# Patient Record
Sex: MALE | Race: WHITE
[De-identification: names, ages, dates, MRNs, and addresses within clinical notes are randomized per-mention and may not be internally consistent; named-entity substitution may affect disease eponyms.]

---

## 2021-11-08 ENCOUNTER — HOSPITAL ENCOUNTER (OUTPATIENT)
Age: 71
End: 2021-11-08
Payer: COMMERCIAL

## 2021-11-08 DIAGNOSIS — M25.512: ICD-10-CM

## 2021-11-08 DIAGNOSIS — M25.511: Primary | ICD-10-CM

## 2021-11-08 PROCEDURE — 73030 X-RAY EXAM OF SHOULDER: CPT

## 2021-11-09 ENCOUNTER — HOSPITAL ENCOUNTER (OUTPATIENT)
Age: 71
End: 2021-11-09
Payer: COMMERCIAL

## 2021-11-09 DIAGNOSIS — R03.0: Primary | ICD-10-CM

## 2021-11-09 DIAGNOSIS — R74.8: ICD-10-CM

## 2021-11-09 LAB
ADD MANUAL DIFF / SLIDE REVIEW: NO
ALBUMIN SERPL-MCNC: 4.2 G/DL (ref 3.5–5)
ALBUMIN/GLOB SERPL: 1.6 {RATIO} (ref 1–2.8)
ALP SERPL-CCNC: 64 U/L (ref 38–126)
ALT SERPL-CCNC: 25 IU/L (ref ?–50)
BUN SERPL-MCNC: 25 MG/DL (ref 9–20)
CALCIUM SERPL-MCNC: 9.2 MG/DL (ref 8.4–10.2)
CHLORIDE SERPL-SCNC: 102 MMOL/L (ref 98–107)
CHOLEST SERPL-MCNC: 165 MG/DL (ref 140–199)
CO2 SERPL-SCNC: 29 MMOL/L (ref 22–32)
ESTIMATED GLOMERULAR FILT RATE: 58.9 ML/MIN (ref 60–?)
GLOBULIN SER CALC-MCNC: 2.7 G/DL (ref 1.7–4.1)
GLUCOSE SERPL-MCNC: 91 MG/DL (ref 80–110)
HDLC SERPL-MCNC: 64 MG/DL (ref 40–60)
HEMATOCRIT: 45.6 % (ref 41–53)
HEMOGLOBIN: 15.6 G/DL (ref 13.5–17.5)
HEMOLYSIS: < 15 (ref 0–50)
LYMPHOCYTES # SPEC AUTO: 1100 /UL (ref 1100–4500)
MCV RBC: 95.2 FL (ref 80–100)
MEAN CORPUSCULAR HEMOGLOBIN: 32.5 PG (ref 26–34)
MEAN CORPUSCULAR HGB CONC: 34.1 % (ref 30–36)
MICROALBUMI CREATININ RATIO UR: (no result) UG/MG CR (ref ?–30)
PLATELET COUNT: 139 X10^3/UL (ref 150–400)
POTASSIUM SERPL-SCNC: 4 MMOL/L (ref 3.4–5.1)
PROT SERPL-MCNC: 6.9 G/DL (ref 6.3–8.2)
SODIUM SERPL-SCNC: 138 MMOL/L (ref 137–145)
TRIGL SERPL-MCNC: 74 MG/DL (ref 35–150)

## 2021-11-09 PROCEDURE — 82043 UR ALBUMIN QUANTITATIVE: CPT

## 2021-11-09 PROCEDURE — 36415 COLL VENOUS BLD VENIPUNCTURE: CPT

## 2021-11-09 PROCEDURE — 80053 COMPREHEN METABOLIC PANEL: CPT

## 2021-11-09 PROCEDURE — 82570 ASSAY OF URINE CREATININE: CPT

## 2021-11-09 PROCEDURE — 80061 LIPID PANEL: CPT

## 2021-11-09 PROCEDURE — 85025 COMPLETE CBC W/AUTO DIFF WBC: CPT

## 2022-02-18 ENCOUNTER — HOSPITAL ENCOUNTER (OUTPATIENT)
Dept: HOSPITAL 73 - ED | Age: 72
Setting detail: OBSERVATION
LOS: 1 days | Discharge: HOME | DRG: 66 | End: 2022-02-19
Payer: COMMERCIAL

## 2022-02-18 VITALS — OXYGEN SATURATION: 100 % | SYSTOLIC BLOOD PRESSURE: 150 MMHG | HEART RATE: 80 BPM | DIASTOLIC BLOOD PRESSURE: 76 MMHG

## 2022-02-18 VITALS — OXYGEN SATURATION: 98 % | RESPIRATION RATE: 17 BRPM | HEART RATE: 84 BPM

## 2022-02-18 VITALS — RESPIRATION RATE: 21 BRPM | HEART RATE: 79 BPM | OXYGEN SATURATION: 99 %

## 2022-02-18 VITALS — DIASTOLIC BLOOD PRESSURE: 81 MMHG | OXYGEN SATURATION: 99 % | HEART RATE: 88 BPM | SYSTOLIC BLOOD PRESSURE: 156 MMHG

## 2022-02-18 VITALS — OXYGEN SATURATION: 98 % | HEART RATE: 88 BPM

## 2022-02-18 VITALS
HEART RATE: 74 BPM | OXYGEN SATURATION: 98 % | RESPIRATION RATE: 18 BRPM | SYSTOLIC BLOOD PRESSURE: 139 MMHG | TEMPERATURE: 98.2 F | DIASTOLIC BLOOD PRESSURE: 77 MMHG

## 2022-02-18 VITALS
HEART RATE: 87 BPM | TEMPERATURE: 97.88 F | SYSTOLIC BLOOD PRESSURE: 214 MMHG | DIASTOLIC BLOOD PRESSURE: 93 MMHG | OXYGEN SATURATION: 99 % | BODY MASS INDEX: 22.1 KG/M2 | RESPIRATION RATE: 15 BRPM

## 2022-02-18 VITALS — DIASTOLIC BLOOD PRESSURE: 93 MMHG | SYSTOLIC BLOOD PRESSURE: 214 MMHG | HEART RATE: 88 BPM | OXYGEN SATURATION: 99 %

## 2022-02-18 VITALS — HEART RATE: 78 BPM | RESPIRATION RATE: 15 BRPM | OXYGEN SATURATION: 100 %

## 2022-02-18 VITALS — HEART RATE: 74 BPM | OXYGEN SATURATION: 98 % | RESPIRATION RATE: 16 BRPM

## 2022-02-18 VITALS — HEART RATE: 85 BPM

## 2022-02-18 VITALS — OXYGEN SATURATION: 92 %

## 2022-02-18 VITALS — OXYGEN SATURATION: 99 % | HEART RATE: 79 BPM | SYSTOLIC BLOOD PRESSURE: 154 MMHG | DIASTOLIC BLOOD PRESSURE: 81 MMHG

## 2022-02-18 VITALS — OXYGEN SATURATION: 99 %

## 2022-02-18 VITALS
DIASTOLIC BLOOD PRESSURE: 85 MMHG | RESPIRATION RATE: 18 BRPM | SYSTOLIC BLOOD PRESSURE: 145 MMHG | OXYGEN SATURATION: 98 % | HEART RATE: 75 BPM

## 2022-02-18 DIAGNOSIS — Z20.822: ICD-10-CM

## 2022-02-18 DIAGNOSIS — I10: ICD-10-CM

## 2022-02-18 DIAGNOSIS — R29.700: ICD-10-CM

## 2022-02-18 DIAGNOSIS — R47.81: ICD-10-CM

## 2022-02-18 DIAGNOSIS — I44.1: ICD-10-CM

## 2022-02-18 DIAGNOSIS — I63.9: Primary | ICD-10-CM

## 2022-02-18 LAB
ADD MANUAL DIFF / SLIDE REVIEW: NO
ALBUMIN SERPL-MCNC: 4.8 G/DL (ref 3.5–5)
ALBUMIN/GLOB SERPL: 1.4 {RATIO} (ref 1–2.8)
ALP SERPL-CCNC: 82 U/L (ref 38–126)
ALT SERPL-CCNC: 23 IU/L (ref ?–50)
BUN SERPL-MCNC: 25 MG/DL (ref 9–20)
CALCIUM SERPL-MCNC: 9.4 MG/DL (ref 8.4–10.2)
CHLORIDE SERPL-SCNC: 104 MMOL/L (ref 98–107)
CK SERPL-CCNC: 80 U/L (ref 55–170)
CKMB % RELATIVE INDEX: (no result) % (ref 1.5–5)
CO2 SERPL-SCNC: 29 MMOL/L (ref 22–32)
ESTIMATED GLOMERULAR FILT RATE: 57.3 ML/MIN (ref 60–?)
GLOBULIN SER CALC-MCNC: 3.4 G/DL (ref 1.7–4.1)
GLUCOSE SERPL-MCNC: 120 MG/DL (ref 80–110)
HEMATOCRIT: 46 % (ref 41–53)
HEMOGLOBIN: 15.9 G/DL (ref 13.5–17.5)
HEMOLYSIS: < 15 (ref 0–50)
INR PPP: 1 (ref 0.9–1.3)
LYMPHOCYTES # SPEC AUTO: 800 /UL (ref 1100–4500)
MCV RBC: 95.6 FL (ref 80–100)
MEAN CORPUSCULAR HEMOGLOBIN: 33.1 PG (ref 26–34)
MEAN CORPUSCULAR HGB CONC: 34.6 % (ref 30–36)
PLATELET COUNT: 139 X10^3/UL (ref 150–400)
POTASSIUM SERPL-SCNC: 4.1 MMOL/L (ref 3.4–5.1)
PROT SERPL-MCNC: 8.2 G/DL (ref 6.3–8.2)
PROTHROMBIN TIME: 11.6 SECONDS (ref 10.1–12.7)
PTT PARTIAL THROMBOPLASTIN TIM: 30 SECONDS (ref 26.4–36.2)
SODIUM SERPL-SCNC: 139 MMOL/L (ref 137–145)
TROPONIN I SERPL-MCNC: < 0.012 NG/ML (ref 0.01–0.03)

## 2022-02-18 PROCEDURE — 80305 DRUG TEST PRSMV DIR OPT OBS: CPT

## 2022-02-18 PROCEDURE — 97161 PT EVAL LOW COMPLEX 20 MIN: CPT

## 2022-02-18 PROCEDURE — 93306 TTE W/DOPPLER COMPLETE: CPT

## 2022-02-18 PROCEDURE — 84484 ASSAY OF TROPONIN QUANT: CPT

## 2022-02-18 PROCEDURE — A9579 GAD-BASE MR CONTRAST NOS,1ML: HCPCS

## 2022-02-18 PROCEDURE — 36415 COLL VENOUS BLD VENIPUNCTURE: CPT

## 2022-02-18 PROCEDURE — 80061 LIPID PANEL: CPT

## 2022-02-18 PROCEDURE — 87635 SARS-COV-2 COVID-19 AMP PRB: CPT

## 2022-02-18 PROCEDURE — 81003 URINALYSIS AUTO W/O SCOPE: CPT

## 2022-02-18 PROCEDURE — 83036 HEMOGLOBIN GLYCOSYLATED A1C: CPT

## 2022-02-18 PROCEDURE — 82550 ASSAY OF CK (CPK): CPT

## 2022-02-18 PROCEDURE — 99291 CRITICAL CARE FIRST HOUR: CPT

## 2022-02-18 PROCEDURE — 82962 GLUCOSE BLOOD TEST: CPT

## 2022-02-18 PROCEDURE — 93010 ELECTROCARDIOGRAM REPORT: CPT

## 2022-02-18 PROCEDURE — 99285 EMERGENCY DEPT VISIT HI MDM: CPT

## 2022-02-18 PROCEDURE — G0378 HOSPITAL OBSERVATION PER HR: HCPCS

## 2022-02-18 PROCEDURE — 71045 X-RAY EXAM CHEST 1 VIEW: CPT

## 2022-02-18 PROCEDURE — 85025 COMPLETE CBC W/AUTO DIFF WBC: CPT

## 2022-02-18 PROCEDURE — 80053 COMPREHEN METABOLIC PANEL: CPT

## 2022-02-18 PROCEDURE — 85610 PROTHROMBIN TIME: CPT

## 2022-02-18 PROCEDURE — 97165 OT EVAL LOW COMPLEX 30 MIN: CPT

## 2022-02-18 PROCEDURE — 70553 MRI BRAIN STEM W/O & W/DYE: CPT

## 2022-02-18 PROCEDURE — 93005 ELECTROCARDIOGRAM TRACING: CPT

## 2022-02-18 PROCEDURE — 84443 ASSAY THYROID STIM HORMONE: CPT

## 2022-02-18 PROCEDURE — 70548 MR ANGIOGRAPHY NECK W/DYE: CPT

## 2022-02-18 PROCEDURE — 99284 EMERGENCY DEPT VISIT MOD MDM: CPT

## 2022-02-18 PROCEDURE — 70450 CT HEAD/BRAIN W/O DYE: CPT

## 2022-02-18 PROCEDURE — 85730 THROMBOPLASTIN TIME PARTIAL: CPT

## 2022-02-18 PROCEDURE — 94760 N-INVAS EAR/PLS OXIMETRY 1: CPT

## 2022-02-18 RX ADMIN — ASPIRIN 324 MG: 81 TABLET, CHEWABLE ORAL at 18:51

## 2022-02-19 VITALS — OXYGEN SATURATION: 99 %

## 2022-02-19 VITALS
RESPIRATION RATE: 18 BRPM | OXYGEN SATURATION: 97 % | SYSTOLIC BLOOD PRESSURE: 139 MMHG | DIASTOLIC BLOOD PRESSURE: 86 MMHG | TEMPERATURE: 97.5 F | HEART RATE: 58 BPM

## 2022-02-19 VITALS
HEART RATE: 66 BPM | DIASTOLIC BLOOD PRESSURE: 70 MMHG | RESPIRATION RATE: 14 BRPM | SYSTOLIC BLOOD PRESSURE: 121 MMHG | OXYGEN SATURATION: 95 % | TEMPERATURE: 97 F

## 2022-02-19 VITALS
SYSTOLIC BLOOD PRESSURE: 122 MMHG | RESPIRATION RATE: 14 BRPM | HEART RATE: 64 BPM | TEMPERATURE: 97 F | OXYGEN SATURATION: 95 % | DIASTOLIC BLOOD PRESSURE: 66 MMHG

## 2022-02-19 LAB
CHOLEST SERPL-MCNC: 143 MG/DL (ref 140–199)
HBA1C MFR BLD: 5 % (ref 4–6)
HDLC SERPL-MCNC: 51 MG/DL (ref 40–60)
TRIGL SERPL-MCNC: 48 MG/DL (ref 35–150)
TROPONIN I SERPL-MCNC: < 0.012 NG/ML (ref 0.01–0.03)
TSH W/ REFLEX TO FT4: 3.77 UIU/ML (ref 0.47–4.68)

## 2022-03-23 ENCOUNTER — HOSPITAL ENCOUNTER (OUTPATIENT)
Age: 72
End: 2022-03-23
Payer: COMMERCIAL

## 2022-03-23 VITALS — BODY MASS INDEX: 22.1 KG/M2

## 2022-03-23 DIAGNOSIS — I63.9: ICD-10-CM

## 2022-03-23 DIAGNOSIS — R00.2: Primary | ICD-10-CM

## 2022-03-23 PROCEDURE — 93246 EXT ECG>7D<15D RECORDING: CPT

## 2022-03-23 PROCEDURE — 93248 EXT ECG>7D<15D REV&INTERPJ: CPT

## 2022-04-25 ENCOUNTER — HOSPITAL ENCOUNTER (OUTPATIENT)
Dept: HOSPITAL 73 - SP | Age: 72
LOS: 65 days | End: 2022-06-29
Payer: COMMERCIAL

## 2022-04-25 VITALS — BODY MASS INDEX: 22.1 KG/M2

## 2022-04-25 DIAGNOSIS — I63.9: ICD-10-CM

## 2022-04-25 DIAGNOSIS — R47.01: Primary | ICD-10-CM

## 2022-04-25 PROCEDURE — 92507 TX SP LANG VOICE COMM INDIV: CPT

## 2022-04-25 PROCEDURE — 92522 EVALUATE SPEECH PRODUCTION: CPT

## 2022-08-19 ENCOUNTER — HOSPITAL ENCOUNTER (OUTPATIENT)
Age: 72
End: 2022-08-19
Payer: COMMERCIAL

## 2022-08-19 VITALS — BODY MASS INDEX: 22.1 KG/M2

## 2022-08-19 DIAGNOSIS — Z20.822: ICD-10-CM

## 2022-08-19 DIAGNOSIS — R00.2: Primary | ICD-10-CM

## 2022-08-19 DIAGNOSIS — R53.83: ICD-10-CM

## 2022-08-19 PROCEDURE — 93017 CV STRESS TEST TRACING ONLY: CPT

## 2022-08-19 PROCEDURE — 87635 SARS-COV-2 COVID-19 AMP PRB: CPT

## 2023-04-26 ENCOUNTER — HOSPITAL ENCOUNTER (OUTPATIENT)
Age: 73
End: 2023-04-26
Payer: COMMERCIAL

## 2023-04-26 VITALS — BODY MASS INDEX: 22.1 KG/M2

## 2023-04-26 DIAGNOSIS — R73.9: ICD-10-CM

## 2023-04-26 DIAGNOSIS — D69.6: Primary | ICD-10-CM

## 2023-04-26 DIAGNOSIS — Z12.5: ICD-10-CM

## 2023-04-26 DIAGNOSIS — Z86.73: ICD-10-CM

## 2023-04-26 LAB
ADD MANUAL DIFF / SLIDE REVIEW: NO
ALBUMIN SERPL-MCNC: 3.9 G/DL (ref 3.5–5)
ALBUMIN/GLOB SERPL: 1.3 {RATIO} (ref 1–2.8)
ALP SERPL-CCNC: 63 U/L (ref 38–126)
ALT SERPL-CCNC: 22 IU/L (ref ?–50)
BUN SERPL-MCNC: 24 MG/DL (ref 9–20)
CALCIUM SERPL-MCNC: 8.3 MG/DL (ref 8.4–10.2)
CHLORIDE SERPL-SCNC: 105 MMOL/L (ref 98–107)
CO2 SERPL-SCNC: 28 MMOL/L (ref 22–32)
ESTIMATED GLOMERULAR FILT RATE: > 60 ML/MIN (ref 60–?)
GLOBULIN SER CALC-MCNC: 3.1 G/DL (ref 1.7–4.1)
GLUCOSE SERPL-MCNC: 99 MG/DL (ref 80–110)
HEMATOCRIT: 43.3 % (ref 41–53)
HEMOGLOBIN: 14.9 G/DL (ref 13.5–17.5)
HEMOLYSIS: < 15 (ref 0–50)
LYMPHOCYTES # SPEC AUTO: 900 /UL (ref 1100–4500)
MCV RBC: 95.3 FL (ref 80–100)
MEAN CORPUSCULAR HEMOGLOBIN: 32.8 PG (ref 26–34)
MEAN CORPUSCULAR HGB CONC: 34.4 % (ref 30–36)
PLATELET COUNT: 115 X10^3/UL (ref 150–400)
POTASSIUM SERPL-SCNC: 4.2 MMOL/L (ref 3.4–5.1)
PROT SERPL-MCNC: 7 G/DL (ref 6.3–8.2)
SODIUM SERPL-SCNC: 136 MMOL/L (ref 137–145)

## 2023-04-26 PROCEDURE — 36415 COLL VENOUS BLD VENIPUNCTURE: CPT

## 2023-04-26 PROCEDURE — 85025 COMPLETE CBC W/AUTO DIFF WBC: CPT

## 2023-04-26 PROCEDURE — 83036 HEMOGLOBIN GLYCOSYLATED A1C: CPT

## 2023-04-26 PROCEDURE — 80053 COMPREHEN METABOLIC PANEL: CPT

## 2023-04-27 ENCOUNTER — HOSPITAL ENCOUNTER (OUTPATIENT)
Age: 73
End: 2023-04-27
Payer: COMMERCIAL

## 2023-04-27 VITALS — BODY MASS INDEX: 22.1 KG/M2

## 2023-04-27 DIAGNOSIS — Z12.11: Primary | ICD-10-CM

## 2023-04-27 LAB
SODIUM UR-SCNC: 5.2 % (ref 4.8–5.6)
X LABCORP ESTIM. AVG GLU (EAG): 103 MG/DL

## 2023-04-27 PROCEDURE — 82274 ASSAY TEST FOR BLOOD FECAL: CPT

## 2023-07-05 ENCOUNTER — HOSPITAL ENCOUNTER (OUTPATIENT)
Age: 73
End: 2023-07-05
Payer: COMMERCIAL

## 2023-07-05 VITALS — BODY MASS INDEX: 22.1 KG/M2

## 2023-07-05 DIAGNOSIS — Z86.73: Primary | ICD-10-CM

## 2023-07-05 LAB
CHOLEST SERPL-MCNC: 107 MG/DL (ref 140–199)
HDLC SERPL-MCNC: 59 MG/DL (ref 40–60)
TRIGL SERPL-MCNC: 58 MG/DL (ref 35–150)

## 2023-07-05 PROCEDURE — 36415 COLL VENOUS BLD VENIPUNCTURE: CPT

## 2023-07-05 PROCEDURE — 80061 LIPID PANEL: CPT

## 2024-12-05 ENCOUNTER — HOSPITAL ENCOUNTER (OUTPATIENT)
Age: 74
Discharge: HOME | End: 2024-12-05
Payer: COMMERCIAL

## 2024-12-05 VITALS
OXYGEN SATURATION: 97 % | TEMPERATURE: 97.2 F | RESPIRATION RATE: 16 BRPM | HEART RATE: 64 BPM | DIASTOLIC BLOOD PRESSURE: 80 MMHG | SYSTOLIC BLOOD PRESSURE: 128 MMHG

## 2024-12-05 VITALS
OXYGEN SATURATION: 95 % | HEART RATE: 66 BPM | DIASTOLIC BLOOD PRESSURE: 57 MMHG | RESPIRATION RATE: 12 BRPM | SYSTOLIC BLOOD PRESSURE: 98 MMHG

## 2024-12-05 VITALS
SYSTOLIC BLOOD PRESSURE: 102 MMHG | RESPIRATION RATE: 10 BRPM | HEART RATE: 66 BPM | DIASTOLIC BLOOD PRESSURE: 57 MMHG | OXYGEN SATURATION: 98 %

## 2024-12-05 VITALS
HEART RATE: 67 BPM | RESPIRATION RATE: 16 BRPM | TEMPERATURE: 97 F | SYSTOLIC BLOOD PRESSURE: 97 MMHG | DIASTOLIC BLOOD PRESSURE: 57 MMHG | OXYGEN SATURATION: 97 %

## 2024-12-05 VITALS
OXYGEN SATURATION: 98 % | DIASTOLIC BLOOD PRESSURE: 78 MMHG | TEMPERATURE: 97.2 F | HEART RATE: 62 BPM | RESPIRATION RATE: 16 BRPM | SYSTOLIC BLOOD PRESSURE: 158 MMHG

## 2024-12-05 VITALS — BODY MASS INDEX: 22.1 KG/M2 | BODY MASS INDEX: 21.9 KG/M2

## 2024-12-05 VITALS
HEART RATE: 71 BPM | SYSTOLIC BLOOD PRESSURE: 111 MMHG | DIASTOLIC BLOOD PRESSURE: 73 MMHG | OXYGEN SATURATION: 98 % | RESPIRATION RATE: 12 BRPM

## 2024-12-05 DIAGNOSIS — Z12.11: Primary | ICD-10-CM

## 2024-12-05 PROCEDURE — 0DJD8ZZ INSPECTION OF LOWER INTESTINAL TRACT, VIA NATURAL OR ARTIFICIAL OPENING ENDOSCOPIC: ICD-10-PCS | Performed by: SURGERY

## 2024-12-05 NOTE — PM.OP.COLON
Operative Date/Time/Diagnoses
Date of procedure: 12/05/24
Time of procedure: 09:52
Pre-op diagnosis: Colon cancer screening
Post-op diagnosis: same

Procedure & Clinicians
Study performed: 
Colonoscopy
Same procedure as scheduled: Yes
Surgeon: Jorge Grimaldo
Procedure Notes
Procedure in detail: 
Surgeon: Jorge Grimaldo MD

Anesthesia:  Rose Gold CRNA

Procedure:  The patient was brought to the endoscopy suite, placed in left lateral decubitus position.  The patient was connected to monitoring devices.  A time-out was performed.  Sedation was administered.  Once the patient was adequately sedated, 
a digital rectal exam was performed and was normal.  The scope was then inserted and advanced to the cecum where the appendiceal orifice was identified and photographed.  The scope was then slowly withdrawn over greater than 6 minutes.  The mucosa 
was thoroughly inspected.  No abnormalities were found.  The scope was retroflexed in the rectum.  The scope was straightened and removed.  The patient was awakened and brought to recovery.

Scope withdrawal time:  10 minutes

Sedation time:  24 minutes

EBL:  0

Findings:  Normal colon

Post-procedure
Disposition: PACU

## 2024-12-05 NOTE — P.HP_ITS
History of Present Illness    
History of Present Illness    
Date Patient Seen: 24    
Time Patient Seen: 08:34    
Chief complaint: Colonoscopy    
Narrative:     
Elan is a 74-year-old man here for a colonoscopy.  He last hadone about 15 years  
ago that was normal.  No family history of colon cancer specifically.    
    
Formerly [facility]    
Medical History (Updated 24 @ 08:35 by Jorge Grimaldo MD)    
    
Anxiety    
Mobitz (type) I (Wenckebach's) atrioventricular block    
1st degree AV block    
HTN (hypertension)    
Aphasia    
Cerebrovascular accident    
Ruptured tympanic membrane (~)    
    
    
Surgical History (Reviewed 22 @ 18:14 by Owen David MD)    
    
Anesthesia    
History of nasal surgery (~)    
    
    
Family History (Reviewed 22 @ 18:14 by Owen David MD)    
Father      Sepsis    
Mother      History of heart disease    
Grandmother      Melanoma    
Grandfather      Intestinal disease    
Grandmother      History of heart disease    
   History of heart attack    
Grandfather      History of heart disease    
    
    
Social History (Reviewed 22 @ 18:14 by Owen David MD)    
household members:  spouse     
Smoking Status:  Never smoker     
alcohol intake:  never     
    
    
    
Meds    
Home Medications and Allergies    
                                Home Medications    
    
    
    
 Medication  Instructions  Recorded  Confirmed  Type    
     
aspirin 81 mg chewable tablet 81 mg PO DAILY #30 tabs 22 Rx    
     
peg 3350-electrolytes 236 240 ml PO Q10M #4,000 mL 10/25/24  Rx    
    
gram-22.74 gram-6.74 gram-5.86        
    
gram solution (Golytely)        
     
losartan 25 mg tablet 25 mg PO ONCE PM 24 History    
     
rosuvastatin 20 mg tablet 20 mg PO ONCE PM 24 History    
    
    
    
                                    Allergies    
    
    
    
Allergy/AdvReac Type Severity Reaction Status Date / Time    
     
codeine Allergy   Verified 24 07:59    
    
    
    
    
Exam    
Vital Signs    
(past 8 hours):     
                                        -    
    
    
    
 24    
08:09    
     
Temperature 97.2 F L    
     
Pulse Rate 62    
     
Respiratory Rate 16    
     
Blood Pressure 158/78 H    
     
Pulse Oximetry 98    
    
    
    
Const    
General: No acute distress    
Resp    
Effort & Inspection: normal respiratory effort    
    
Assessment & Plan    
Assessment and plan    
(1) Colon cancer screening:     
      Status: Acute    
    
Plan    
Colonoscopy    
Time-Based Coding    
::     
[TOTAL MINUTES] spent with patient and on the chart (including review of chart,   
obtaining history, exam, reviewing outside data, placing orders, documenting   
exam and treatment plan, and counseling patient) on [DATE].

## 2024-12-05 NOTE — PM.HP.1
History of Present Illness
History of Present Illness
Date Patient Seen: 24
Time Patient Seen: 08:34
Chief complaint: Colonoscopy
Narrative: 
Elan is a 74-year-old man here for a colonoscopy.  He last hadone about 15 years ago that was normal.  No family history of colon cancer specifically.

[facility]
Medical History (Updated 24 @ 08:35 by Jorge Grimaldo MD)

Anxiety
Mobitz (type) I (Wenckebach's) atrioventricular block
1st degree AV block
HTN (hypertension)
Aphasia
Cerebrovascular accident
Ruptured tympanic membrane (~)


Surgical History (Reviewed 22 @ 18:14 by Owen David MD)

Anesthesia
History of nasal surgery (~)


Family History (Reviewed 22 @ 18:14 by Owen David MD)
Father   
 Sepsis
Mother   
 History of heart disease
Grandmother   
 Melanoma
Grandfather   
 Intestinal disease
Grandmother   
 History of heart disease
 History of heart attack
Grandfather   
 History of heart disease


Social History (Reviewed 22 @ 18:14 by Owen David MD)
household members:  spouse 
Smoking Status:  Never smoker 
alcohol intake:  never 



Meds
Home Medications and Allergies
Home Medications

 Medication  Instructions  Recorded  Confirmed  Type
aspirin 81 mg chewable tablet 81 mg PO DAILY #30 tabs 22 Rx
peg 3350-electrolytes 236 240 ml PO Q10M #4,000 mL 10/25/24  Rx
gram-22.74 gram-6.74 gram-5.86    
gram solution (Golytely)    
losartan 25 mg tablet 25 mg PO ONCE PM 24 History
rosuvastatin 20 mg tablet 20 mg PO ONCE PM 24 History


Allergies

Allergy/AdvReac Type Severity Reaction Status Date / Time
codeine Allergy   Verified 24 07:59



Exam
Vital Signs
(past 8 hours): 
-

 24
08:09
Temperature 97.2 F L
Pulse Rate 62
Respiratory Rate 16
Blood Pressure 158/78 H
Pulse Oximetry 98


Const
General: No acute distress
Resp
Effort & Inspection: normal respiratory effort

Assessment & Plan
Assessment and plan
(1) Colon cancer screening: 
      Status: Acute

Plan
Colonoscopy
Time-Based Coding
:: 
[TOTAL MINUTES] spent with patient and on the chart (including review of chart, obtaining history, exam, reviewing outside data, placing orders, documenting exam and treatment plan, and counseling patient) on [DATE].
